# Patient Record
Sex: FEMALE | Race: WHITE
[De-identification: names, ages, dates, MRNs, and addresses within clinical notes are randomized per-mention and may not be internally consistent; named-entity substitution may affect disease eponyms.]

---

## 2020-07-06 ENCOUNTER — HOSPITAL ENCOUNTER (OUTPATIENT)
Dept: HOSPITAL 84 - D.CT | Age: 50
Discharge: HOME | End: 2020-07-06
Attending: INTERNAL MEDICINE
Payer: MEDICAID

## 2020-07-06 DIAGNOSIS — I10: ICD-10-CM

## 2020-07-06 DIAGNOSIS — R07.89: ICD-10-CM

## 2020-07-06 DIAGNOSIS — E78.5: ICD-10-CM

## 2020-07-06 DIAGNOSIS — D72.829: Primary | ICD-10-CM

## 2020-07-06 DIAGNOSIS — E03.9: ICD-10-CM

## 2020-07-06 DIAGNOSIS — Z53.9: ICD-10-CM

## 2020-07-06 DIAGNOSIS — E10.9: ICD-10-CM

## 2020-07-06 DIAGNOSIS — Z79.4: ICD-10-CM

## 2020-07-06 DIAGNOSIS — J45.909: ICD-10-CM

## 2020-07-06 DIAGNOSIS — R06.00: ICD-10-CM

## 2020-07-06 DIAGNOSIS — D68.61: ICD-10-CM

## 2020-07-06 LAB
ANION GAP SERPL CALC-SCNC: 8.6 MMOL/L (ref 8–16)
APTT BLD: 26.6 SECONDS (ref 22.8–39.4)
BASOPHILS NFR BLD AUTO: 0.3 % (ref 0–2)
BUN SERPL-MCNC: 29 MG/DL (ref 7–18)
CALCIUM SERPL-MCNC: 9 MG/DL (ref 8.5–10.1)
CHLORIDE SERPL-SCNC: 105 MMOL/L (ref 98–107)
CO2 SERPL-SCNC: 27.8 MMOL/L (ref 21–32)
CREAT SERPL-MCNC: 1.3 MG/DL (ref 0.6–1.3)
EOSINOPHIL NFR BLD: 4.9 % (ref 0–7)
ERYTHROCYTE [DISTWIDTH] IN BLOOD BY AUTOMATED COUNT: 20 % (ref 11.5–14.5)
GLUCOSE SERPL-MCNC: 116 MG/DL (ref 74–106)
HCG SERPL-ACNC: NEGATIVE M[IU]/ML
HCT VFR BLD CALC: 37.9 % (ref 36–48)
HGB BLD-MCNC: 12.5 G/DL (ref 12–16)
IMM GRANULOCYTES NFR BLD: 0.4 % (ref 0–5)
INR PPP: 0.92 (ref 0.85–1.17)
LYMPHOCYTES NFR BLD AUTO: 17 % (ref 15–50)
MCH RBC QN AUTO: 29 PG (ref 26–34)
MCHC RBC AUTO-ENTMCNC: 33 G/DL (ref 31–37)
MCV RBC: 87.9 FL (ref 80–100)
MONOCYTES NFR BLD: 7.2 % (ref 2–11)
NEUTROPHILS NFR BLD AUTO: 70.2 % (ref 40–80)
OSMOLALITY SERPL CALC.SUM OF ELEC: 280 MOSM/KG (ref 275–300)
PLATELET # BLD: 335 10X3/UL (ref 130–400)
PMV BLD AUTO: 9.1 FL (ref 7.4–10.4)
POTASSIUM SERPL-SCNC: 4.4 MMOL/L (ref 3.5–5.1)
PROTHROMBIN TIME: 12.3 SECONDS (ref 11.6–15)
RBC # BLD AUTO: 4.31 10X6/UL (ref 4–5.4)
SODIUM SERPL-SCNC: 137 MMOL/L (ref 136–145)
WBC # BLD AUTO: 17.1 10X3/UL (ref 4.8–10.8)

## 2020-07-06 NOTE — NUR
ARRIVED TO FLOOR DRINKING TEA, DR. ECHAVARRIA NOTIFIED.  PROCEDURE MAY STILL BE
DONE BUT WITHOUT SEDATION OR PROCEDURE CAN BE RESCHEDULED.  OPTIONS EXPLAINED
AND PT DECIDED TO CANCEL AND RESCHEDULE PROCEDURE.

## 2020-07-09 ENCOUNTER — HOSPITAL ENCOUNTER (OUTPATIENT)
Dept: HOSPITAL 84 - D.CT | Age: 50
Discharge: HOME | End: 2020-07-09
Attending: INTERNAL MEDICINE
Payer: MEDICAID

## 2020-07-09 VITALS — BODY MASS INDEX: 43.4 KG/M2 | HEIGHT: 69 IN | BODY MASS INDEX: 43.4 KG/M2 | WEIGHT: 293 LBS

## 2020-07-09 DIAGNOSIS — F31.9: ICD-10-CM

## 2020-07-09 DIAGNOSIS — E78.5: ICD-10-CM

## 2020-07-09 DIAGNOSIS — Z79.4: ICD-10-CM

## 2020-07-09 DIAGNOSIS — M10.9: ICD-10-CM

## 2020-07-09 DIAGNOSIS — E83.52: ICD-10-CM

## 2020-07-09 DIAGNOSIS — D72.829: Primary | ICD-10-CM

## 2020-07-09 DIAGNOSIS — R06.00: ICD-10-CM

## 2020-07-09 DIAGNOSIS — R60.0: ICD-10-CM

## 2020-07-09 DIAGNOSIS — J45.909: ICD-10-CM

## 2020-07-09 DIAGNOSIS — I10: ICD-10-CM

## 2020-07-09 DIAGNOSIS — E63.8: ICD-10-CM

## 2020-07-09 DIAGNOSIS — M54.5: ICD-10-CM

## 2020-07-09 DIAGNOSIS — E11.40: ICD-10-CM

## 2020-07-09 DIAGNOSIS — E03.9: ICD-10-CM

## 2020-07-09 LAB
ANION GAP SERPL CALC-SCNC: 8.9 MMOL/L (ref 8–16)
APTT BLD: 23.2 SECONDS (ref 22.8–39.4)
BUN SERPL-MCNC: 36 MG/DL (ref 7–18)
CALCIUM SERPL-MCNC: 9 MG/DL (ref 8.5–10.1)
CHLORIDE SERPL-SCNC: 109 MMOL/L (ref 98–107)
CO2 SERPL-SCNC: 25.4 MMOL/L (ref 21–32)
CREAT SERPL-MCNC: 1.2 MG/DL (ref 0.6–1.3)
EOSINOPHIL NFR BLD: 2 % (ref 0–7)
ERYTHROCYTE [DISTWIDTH] IN BLOOD BY AUTOMATED COUNT: 19.6 % (ref 11.5–14.5)
GLUCOSE SERPL-MCNC: 118 MG/DL (ref 74–106)
HCG UR QL: NEGATIVE
HCT VFR BLD CALC: 37.2 % (ref 36–48)
HGB BLD-MCNC: 12.4 G/DL (ref 12–16)
IMM GRANULOCYTES NFR BLD: 0.7 % (ref 0–5)
INR PPP: 0.95 (ref 0.85–1.17)
LYMPHOCYTES NFR BLD AUTO: 22 % (ref 15–50)
MCH RBC QN AUTO: 28.9 PG (ref 26–34)
MCHC RBC AUTO-ENTMCNC: 33.3 G/DL (ref 31–37)
MCV RBC: 86.7 FL (ref 80–100)
MONOCYTES NFR BLD: 12 % (ref 2–11)
NEUTROPHILS NFR BLD AUTO: 64 % (ref 40–80)
OSMOLALITY SERPL CALC.SUM OF ELEC: 286 MOSM/KG (ref 275–300)
PLATELET # BLD EST: NORMAL 10*3/UL
PLATELET # BLD: 352 10X3/UL (ref 130–400)
PMV BLD AUTO: 9.1 FL (ref 7.4–10.4)
POTASSIUM SERPL-SCNC: 4.3 MMOL/L (ref 3.5–5.1)
PROTHROMBIN TIME: 12.6 SECONDS (ref 11.6–15)
RBC # BLD AUTO: 4.29 10X6/UL (ref 4–5.4)
SODIUM SERPL-SCNC: 139 MMOL/L (ref 136–145)
WBC # BLD AUTO: 20.9 10X3/UL (ref 4.8–10.8)

## 2020-07-09 NOTE — NUR
1330-VSS.DENIES PAIN. DRESSING CDI. NO DISTRESS. TOLERATED SANDWHICH
TRAY. HAS AMBULATED TO RESTROOM AND VOIDED WITHOUT COMPLICATIONS.
REMOVED IV WITH CATH INTACT,DISPOSED INTO SHARPS,COVERED WITH
GUAZE,SECURED WITH MEDIPORE TAPE.

## 2020-07-09 NOTE — NUR
1345-PT DRESSED. REVIEWED POST OPERATIVE INSTRUCTIONS. VERBALIZED
UNDERSTANDING. ESCORTED OUT VIA W/C BY STAFF WITH FATHER AWAITING TO
DRIVE HOME.